# Patient Record
Sex: MALE | Race: BLACK OR AFRICAN AMERICAN | NOT HISPANIC OR LATINO | URBAN - METROPOLITAN AREA
[De-identification: names, ages, dates, MRNs, and addresses within clinical notes are randomized per-mention and may not be internally consistent; named-entity substitution may affect disease eponyms.]

---

## 2019-02-15 ENCOUNTER — TELEPHONE (OUTPATIENT)
Dept: AUDIOLOGY | Facility: CLINIC | Age: 69
End: 2019-02-15

## 2019-02-15 NOTE — TELEPHONE ENCOUNTER
Returned patient's phone call.  Patient did not purchase hearing aids from our office.  I offered patient an appointment to discuss new hearing aids but also informed him that his hearing aid benefit through [facility] is not eligible through [facility].  He will call N to find out where he needs to go to receive his hearing aid benefit.    ----- Message from Kiah Jean-Baptiste sent at 2/15/2019 12:26 PM CST -----  Contact: self   Patient says he need to come in for his regular hearing aid check up and discuss getting a second pair because the insurance told him he was eligible. Please call patient at [phone].